# Patient Record
Sex: FEMALE | Race: WHITE | Employment: PART TIME | URBAN - METROPOLITAN AREA
[De-identification: names, ages, dates, MRNs, and addresses within clinical notes are randomized per-mention and may not be internally consistent; named-entity substitution may affect disease eponyms.]

---

## 2019-12-08 ENCOUNTER — HOSPITAL ENCOUNTER (EMERGENCY)
Facility: HOSPITAL | Age: 24
Discharge: HOME | End: 2019-12-08
Attending: EMERGENCY MEDICINE
Payer: OTHER MISCELLANEOUS

## 2019-12-08 VITALS
DIASTOLIC BLOOD PRESSURE: 78 MMHG | TEMPERATURE: 98 F | WEIGHT: 179 LBS | OXYGEN SATURATION: 98 % | SYSTOLIC BLOOD PRESSURE: 116 MMHG | RESPIRATION RATE: 16 BRPM | HEART RATE: 67 BPM

## 2019-12-08 DIAGNOSIS — T23.111A SUPERFICIAL BURN OF RIGHT THUMB, INITIAL ENCOUNTER: Primary | ICD-10-CM

## 2019-12-08 PROCEDURE — 99283 EMERGENCY DEPT VISIT LOW MDM: CPT

## 2019-12-08 PROCEDURE — 16000 INITIAL TREATMENT OF BURN(S): CPT

## 2019-12-08 RX ORDER — BUPROPION HYDROCHLORIDE 100 MG/1
100 TABLET ORAL 2 TIMES DAILY
COMMUNITY
End: 2020-08-26

## 2019-12-08 RX ORDER — CITALOPRAM 20 MG/1
20 TABLET ORAL DAILY
COMMUNITY
End: 2020-08-26

## 2019-12-09 NOTE — ED INITIAL ASSESSMENT (HPI)
Pt arrives with c/o burn to right hand. Pt was heating up a cup of chili and the hot chili spilled on her hand, Minimal redness noted to thumb and small finger, no blisters.

## 2019-12-09 NOTE — ED PROVIDER NOTES
Patient Seen in: [facility] Emergency Department      History   Patient presents with:  Trauma    Stated Complaint: burn to hand with chili (work related)    HPI    Patient is a 25-year-old female who works at [email or web address] who splash/spilled some ED Course   Labs Reviewed - No data to display               MDM     43-year-old female presenting after she burned herself with some hot chili at work. Mild erythema but no open wounds or blisters. All first-degree at this point.   Certainly it is po

## (undated) NOTE — ED AVS SNAPSHOT
Surinder Marquis   MRN: [id number]    Department:  [facility] Emergency Department   Date of Visit:  12/8/2019           Disclosure     Insurance plans vary and the physician(s) referred by the ER may not be covered by your plan.  Please contact you tell this physician (or your personal doctor if your instructions are to return to your personal doctor) about any new or lasting problems. The primary care or specialist physician will see patients referred from the [facility] Emergency Department.  Susan Mustafa